# Patient Record
Sex: MALE | Race: WHITE | NOT HISPANIC OR LATINO | Employment: FULL TIME | URBAN - METROPOLITAN AREA
[De-identification: names, ages, dates, MRNs, and addresses within clinical notes are randomized per-mention and may not be internally consistent; named-entity substitution may affect disease eponyms.]

---

## 2023-03-03 ENCOUNTER — OFFICE VISIT (OUTPATIENT)
Dept: URGENT CARE | Facility: URGENT CARE | Age: 23
End: 2023-03-03
Payer: COMMERCIAL

## 2023-03-03 VITALS
HEART RATE: 91 BPM | DIASTOLIC BLOOD PRESSURE: 80 MMHG | OXYGEN SATURATION: 98 % | RESPIRATION RATE: 16 BRPM | SYSTOLIC BLOOD PRESSURE: 122 MMHG

## 2023-03-03 DIAGNOSIS — R21 RASH: Primary | ICD-10-CM

## 2023-03-03 PROCEDURE — 99203 OFFICE O/P NEW LOW 30 MIN: CPT | Performed by: FAMILY MEDICINE

## 2023-03-03 RX ORDER — KETOCONAZOLE 20 MG/G
CREAM TOPICAL 2 TIMES DAILY
Qty: 30 G | Refills: 0 | Status: SHIPPED | OUTPATIENT
Start: 2023-03-03 | End: 2023-03-17

## 2023-03-03 NOTE — PROGRESS NOTES
SUBJECTIVE: Luis Olivarez is a 23 year old male presenting with a chief complaint of rash legs.  Onset of symptoms was day(s) ago.  No past medical history on file.  Allergies   Allergen Reactions     Desonide Itching     Social History     Tobacco Use     Smoking status: Never     Smokeless tobacco: Never   Substance Use Topics     Alcohol use: Not on file       ROS:  SKIN: no rash  GI: no vomiting    OBJECTIVE:  /80   Pulse 91   Resp 16   SpO2 98% GENERAL APPEARANCE: healthy, alert and no distress  SKIN: bilateral legs anular ring rash      ICD-10-CM    1. Rash  R21 ketoconazole (NIZORAL) 2 % external cream     Adult Dermatology Referral          Fluids/Rest, f/u if worse/not any better

## 2023-06-02 ENCOUNTER — HEALTH MAINTENANCE LETTER (OUTPATIENT)
Age: 23
End: 2023-06-02

## 2024-06-29 ENCOUNTER — HEALTH MAINTENANCE LETTER (OUTPATIENT)
Age: 24
End: 2024-06-29

## 2025-07-12 ENCOUNTER — HEALTH MAINTENANCE LETTER (OUTPATIENT)
Age: 25
End: 2025-07-12

## 2025-07-27 ENCOUNTER — HOSPITAL ENCOUNTER (EMERGENCY)
Facility: CLINIC | Age: 25
Discharge: HOME | End: 2025-07-28
Attending: EMERGENCY MEDICINE | Admitting: EMERGENCY MEDICINE
Payer: OTHER MISCELLANEOUS

## 2025-07-27 DIAGNOSIS — M54.41 ACUTE RIGHT-SIDED LOW BACK PAIN WITH RIGHT-SIDED SCIATICA: Primary | ICD-10-CM

## 2025-07-27 PROCEDURE — 99284 EMERGENCY DEPT VISIT MOD MDM: CPT | Performed by: EMERGENCY MEDICINE

## 2025-07-27 PROCEDURE — 250N000013 HC RX MED GY IP 250 OP 250 PS 637: Performed by: EMERGENCY MEDICINE

## 2025-07-27 PROCEDURE — 96372 THER/PROPH/DIAG INJ SC/IM: CPT | Performed by: EMERGENCY MEDICINE

## 2025-07-27 PROCEDURE — 250N000011 HC RX IP 250 OP 636: Performed by: EMERGENCY MEDICINE

## 2025-07-27 RX ORDER — CYCLOBENZAPRINE HCL 10 MG
10 TABLET ORAL ONCE
Status: COMPLETED | OUTPATIENT
Start: 2025-07-27 | End: 2025-07-27

## 2025-07-27 RX ADMIN — CYCLOBENZAPRINE 10 MG: 10 TABLET, FILM COATED ORAL at 23:27

## 2025-07-27 RX ADMIN — HYDROMORPHONE HYDROCHLORIDE 1 MG: 1 INJECTION, SOLUTION INTRAMUSCULAR; INTRAVENOUS; SUBCUTANEOUS at 23:27

## 2025-07-27 ASSESSMENT — ACTIVITIES OF DAILY LIVING (ADL): ADLS_ACUITY_SCORE: 41

## 2025-07-27 ASSESSMENT — COLUMBIA-SUICIDE SEVERITY RATING SCALE - C-SSRS
1. IN THE PAST MONTH, HAVE YOU WISHED YOU WERE DEAD OR WISHED YOU COULD GO TO SLEEP AND NOT WAKE UP?: NO
6. HAVE YOU EVER DONE ANYTHING, STARTED TO DO ANYTHING, OR PREPARED TO DO ANYTHING TO END YOUR LIFE?: NO
2. HAVE YOU ACTUALLY HAD ANY THOUGHTS OF KILLING YOURSELF IN THE PAST MONTH?: NO

## 2025-07-27 NOTE — Clinical Note
Luis Olivarez was seen and treated in our emergency department on 7/27/2025.  He may return to work on 07/30/2025.  Mr. Olivarez was seen in our emergency department this evening.  He may need the next several days off of work to recover.  He may return without restrictions when he feels well enough to perform his work duties.     If you have any questions or concerns, please don't hesitate to call.      Trierweiler, Chad A, MD

## 2025-07-28 VITALS
HEART RATE: 71 BPM | WEIGHT: 200 LBS | BODY MASS INDEX: 25.67 KG/M2 | SYSTOLIC BLOOD PRESSURE: 126 MMHG | HEIGHT: 74 IN | TEMPERATURE: 97 F | OXYGEN SATURATION: 97 % | DIASTOLIC BLOOD PRESSURE: 77 MMHG | RESPIRATION RATE: 16 BRPM

## 2025-07-28 PROCEDURE — 250N000013 HC RX MED GY IP 250 OP 250 PS 637: Performed by: EMERGENCY MEDICINE

## 2025-07-28 RX ORDER — OXYCODONE HYDROCHLORIDE 5 MG/1
5 TABLET ORAL ONCE
Refills: 0 | Status: COMPLETED | OUTPATIENT
Start: 2025-07-28 | End: 2025-07-28

## 2025-07-28 RX ORDER — MORPHINE SULFATE 15 MG/1
15 TABLET ORAL EVERY 4 HOURS PRN
Qty: 10 TABLET | Refills: 0 | Status: SHIPPED | OUTPATIENT
Start: 2025-07-28

## 2025-07-28 RX ORDER — CYCLOBENZAPRINE HCL 10 MG
10 TABLET ORAL 3 TIMES DAILY PRN
Qty: 20 TABLET | Refills: 0 | Status: SHIPPED | OUTPATIENT
Start: 2025-07-28

## 2025-07-28 RX ADMIN — OXYCODONE HYDROCHLORIDE 5 MG: 5 TABLET ORAL at 00:38

## 2025-07-28 ASSESSMENT — ACTIVITIES OF DAILY LIVING (ADL): ADLS_ACUITY_SCORE: 41

## 2025-07-28 NOTE — ED TRIAGE NOTES
Pt has been treated for right sided sciatica with gabapentin, tylenol,and miloxicam for approx a week. Pt reports pain has gotten worse today. Numbness in ankle.     Triage Assessment (Adult)       Row Name 07/27/25 8577          Triage Assessment    Airway WDL WDL        Respiratory WDL    Respiratory WDL WDL        Skin Circulation/Temperature WDL    Skin Circulation/Temperature WDL WDL        Cardiac WDL    Cardiac WDL WDL        Peripheral/Neurovascular WDL    Peripheral Neurovascular WDL WDL        Cognitive/Neuro/Behavioral WDL    Cognitive/Neuro/Behavioral WDL WDL

## 2025-07-28 NOTE — ED PROVIDER NOTES
"  Emergency Department Note      History of Present Illness     Chief Complaint   Back Pain (Pt has been treated for right sided sciatica for approx a week on gapapentin, tylenol, and miloxicam. Pt has been to PT. Reports pain got worse this AM.)      HPI   Luis Olivarez is a 25 year old male presenting to us with exacerbation of his right sided low back pain with associated sciatica.  He injured his back approximately 2 weeks ago while moving a PNO at work.  He has since followed up with an outpatient orthopedist and undergone an MRI showing a significant disc bulge to the L5-S1 area of his lumbar spine.  He was feeling quite a bit improved yesterday, taking his prescribed Mobic, gabapentin, and Tylenol.  However, the pain was worse today and he has been experiencing intermittent muscle spasms with increasing sciatic symptoms.  He was unable to lay down tonight, prompting his visit to the ER.  He otherwise denies new trauma.  He denies issues with bowel or bladder.  He denies other complaints at this juncture.    Independent Historian   None    Review of External Notes   Yes-I reviewed the patient's visit to [facility] orthopedics on July 23 and July 24, reviewing his MRI results.    Past Medical History     Medical History and Problem List   No past medical history on file.    Medications   KEFLEX 250 MG/5ML OR SUSR  NO ACTIVE MEDICATIONS        Surgical History   No past surgical history on file.    Physical Exam     Patient Vitals for the past 24 hrs:   BP Temp Temp src Pulse Resp SpO2 Height Weight   07/27/25 2250 -- -- -- -- -- -- 1.88 m (6' 2\") 90.7 kg (200 lb)   07/27/25 2247 132/77 97  F (36.1  C) Temporal 90 24 96 % -- --     Physical Exam  Eye:  Pupils are equal, round, and reactive.  Extraocular movements intact.    ENT:  No rhinorrhea.  Moist mucus membranes.  Normal tongue and tonsil.    Cardiac:  Regular rate and rhythm.  No murmurs, gallops, or rubs.    Pulmonary:  Clear to auscultation bilaterally.  " No wheezes, rales, or rhonchi.    Abdomen:  Positive bowel sounds.  Abdomen is soft and non-distended, without focal tenderness.    Musculoskeletal:  No midline tenderness to the spine.  There is focal tenderness to the right lower lumbar musculature extending into the buttock and sciatic notch..    Skin:  Warm and dry without rashes.  Feet are equally warm with strong DP pulses.    Neuro:  Normal sensation throughout the legs and perineum.  5/5 strength through the hips/knees/ankles.  2+ patellar reflexes.  Normal gait.    Psych:  Normal affect with appropriate interaction with examiner.        Diagnostics         ED Course      Medications Administered   Medications - No data to display    Procedures   Procedures     Discussion of Management   None    ED Course        Additional Documentation  None    Medical Decision Making / Diagnosis     CMS Diagnoses: None    MIPS   None               [facility]   Luis Olivarez is a 25 year old male presenting to us with exacerbation of his known low back issues with MRI showing evidence of a disc bulge with associated sciatica.  There was no new trauma or other red flags on his physical exam that lead me to believe he would require repeat imaging.  The patient was given a dose of Dilaudid and Flexeril with significant improvement in symptoms.  Will plan to discharge him on instant release morphine and further Flexeril.  He is scheduled to undergo a epidural injection by his orthopedist this week which I advised him to be in touch with them about.  Otherwise, he will return to us for any worsening of condition or other emergent concerns.    Disposition   The patient was discharged.     Diagnosis   No diagnosis found.     Discharge Medications   New Prescriptions    No medications on file         Chad A. Trierweiler, MD Trierweiler, Chad A, MD  07/28/25 0124

## 2025-07-30 ENCOUNTER — HOSPITAL ENCOUNTER (EMERGENCY)
Facility: CLINIC | Age: 25
Discharge: HOME | End: 2025-07-30
Attending: EMERGENCY MEDICINE | Admitting: EMERGENCY MEDICINE

## 2025-07-30 VITALS
TEMPERATURE: 97.9 F | DIASTOLIC BLOOD PRESSURE: 83 MMHG | HEIGHT: 74 IN | OXYGEN SATURATION: 98 % | RESPIRATION RATE: 16 BRPM | WEIGHT: 200 LBS | BODY MASS INDEX: 25.67 KG/M2 | HEART RATE: 88 BPM | SYSTOLIC BLOOD PRESSURE: 125 MMHG

## 2025-07-30 DIAGNOSIS — M54.41 ACUTE RIGHT-SIDED LOW BACK PAIN WITH RIGHT-SIDED SCIATICA: Primary | ICD-10-CM

## 2025-07-30 PROCEDURE — 96372 THER/PROPH/DIAG INJ SC/IM: CPT | Performed by: EMERGENCY MEDICINE

## 2025-07-30 PROCEDURE — 250N000013 HC RX MED GY IP 250 OP 250 PS 637: Performed by: EMERGENCY MEDICINE

## 2025-07-30 PROCEDURE — 99284 EMERGENCY DEPT VISIT MOD MDM: CPT | Performed by: EMERGENCY MEDICINE

## 2025-07-30 PROCEDURE — 250N000011 HC RX IP 250 OP 636: Performed by: EMERGENCY MEDICINE

## 2025-07-30 RX ORDER — KETOROLAC TROMETHAMINE 15 MG/ML
15 INJECTION, SOLUTION INTRAMUSCULAR; INTRAVENOUS ONCE
Status: COMPLETED | OUTPATIENT
Start: 2025-07-30 | End: 2025-07-30

## 2025-07-30 RX ORDER — OXYCODONE HYDROCHLORIDE 5 MG/1
5 TABLET ORAL ONCE
Refills: 0 | Status: COMPLETED | OUTPATIENT
Start: 2025-07-30 | End: 2025-07-30

## 2025-07-30 RX ORDER — ACETAMINOPHEN 500 MG
1000 TABLET ORAL ONCE
Status: COMPLETED | OUTPATIENT
Start: 2025-07-30 | End: 2025-07-30

## 2025-07-30 RX ADMIN — OXYCODONE HYDROCHLORIDE 5 MG: 5 TABLET ORAL at 09:11

## 2025-07-30 RX ADMIN — KETOROLAC TROMETHAMINE 15 MG: 15 INJECTION, SOLUTION INTRAMUSCULAR; INTRAVENOUS at 09:11

## 2025-07-30 RX ADMIN — ACETAMINOPHEN 1000 MG: 500 TABLET, FILM COATED ORAL at 09:11

## 2025-07-30 ASSESSMENT — ACTIVITIES OF DAILY LIVING (ADL)
ADLS_ACUITY_SCORE: 41
ADLS_ACUITY_SCORE: 41

## 2025-07-30 ASSESSMENT — COLUMBIA-SUICIDE SEVERITY RATING SCALE - C-SSRS
1. IN THE PAST MONTH, HAVE YOU WISHED YOU WERE DEAD OR WISHED YOU COULD GO TO SLEEP AND NOT WAKE UP?: NO
2. HAVE YOU ACTUALLY HAD ANY THOUGHTS OF KILLING YOURSELF IN THE PAST MONTH?: NO
6. HAVE YOU EVER DONE ANYTHING, STARTED TO DO ANYTHING, OR PREPARED TO DO ANYTHING TO END YOUR LIFE?: NO

## 2025-07-30 NOTE — ED PROVIDER NOTES
"  Emergency Department Note      History of Present Illness     Chief Complaint   Leg Pain      HPI   Luis Olivarez is a 25 year old male who presents with worsening right lower back pain that radiates down his right leg for approximately 2 weeks. The patient reports he lifted a 500 lb upright piano 2 weeks ago while working as a  at [employer]. He reports his pain worsens with movement and has not been able to sleep well last night. He has an appointment with [facility] later today. He reports taking meloxicam last night and took two 325 mg tablets of Tylenol today at about 0200. He denies any new strenuous or heavy activity yesterday. Denies upper back pain, abdominal pain, loss of control of bowel or bladder, hematuria, dysuria or urinary frequency. He did not take any narcotics or ibuprofen yesterday. He is otherwise healthy.    Independent Historian   None    Review of External Notes   I reviewed ED note from 7/23/25 with Dr. Trierweiler.     Past Medical History     Medical History and Problem List   Dermatitis     Medications   Flexeril    Physical Exam     Patient Vitals for the past 24 hrs:   BP Temp Temp src Pulse Resp SpO2 Height Weight   07/30/25 0732 125/83 97.9  F (36.6  C) Temporal 88 16 98 % 1.88 m (6' 2\") 90.7 kg (200 lb)     Physical Exam  General:  Sitting on bed.   HENT:  No obvious trauma to head  Right Ear:  External ear normal.   Left Ear:  External ear normal.   Nose:  Nose normal.   Eyes:  Conjunctivae and EOM are normal. Pupils are equal, round, and reactive.   Neck: Normal range of motion. Neck supple. No tracheal deviation present.   CV:  Normal heart sounds. No murmur heard.  Pulm/Chest: Effort normal and breath sounds normal.   Abd: Soft. No distension. There is no tenderness. There is no rigidity, no rebound and no guarding. No CVA tenderness B/L. No pulsatile abdominal mass.  M/S: Normal range of motion. Muscle strength is +5 proximal and distal in the " bilateral lower extremities. Tenderness over the lower right paraspinal musculature. No midline tenderness or step off.  Neuro: Alert. GCS 15. Reflexes +2 B/L patella and achilles  Skin: Skin is warm and dry. No rash noted. Not diaphoretic. No rash of shingles on low back.  Psych: Normal mood and affect. Behavior is normal.       Diagnostics     Independent Interpretation   None    ED Course      Medications Administered   Medications   oxyCODONE (ROXICODONE) tablet 5 mg (5 mg Oral $Given 7/30/25 0911)   acetaminophen (TYLENOL) tablet 1,000 mg (1,000 mg Oral $Given 7/30/25 0911)   ketorolac (TORADOL) injection 15 mg (15 mg Intramuscular $Given 7/30/25 0911)     Procedures   Procedures     Discussion of Management   None    ED Course   ED Course as of 07/30/25 0921 Wed Jul 30, 2025 0900 I obtained history and examined the patient as noted above. I explained findings to the patient and we discussed plan for discharge. The patient is comfortable with this plan.       Additional Documentation  None    Medical Decision Making / Diagnosis     CMS Diagnoses: None    MIPS   None               [facility]   Luis Olivarez is a very pleasant 25 year old male who presents with concern of right low back pain he has had this for 2 weeks after an injury while on the job.  Patient has a follow-up today at [facility] orthopedics.  The patient's pain has been persistent and he could not get his narcotic pain medications filled.  The patient presents now because he has an appointment at 10 AM and does not feel like he can wait until his appointment.  Patient requesting pain control here.  The above interventions were provided.  Recommended against any narcotic prescriptions at this time and yielded to the follow-up spine specialist that he is planning to see in 1 hour.  The patient's partner is here to drive him to that appointment.  Patient does not have any CVA tenderness to suggest kidney stone or pyelonephritis.  No hematuria.  No  abdominal pain to suggest referred pain to the back from a intra-abdominal process such as appendicitis.  Reflexes are intact.  Strength is intact.  No loss of bowel or bladder.  Doubt cauda equina syndrome.  No prior back surgeries and doubt epidural abscess.  He is afebrile.  No rash or shingles.  Suspect this is persistent musculoskeletal back pain.    The treatment plan was discussed with the patient and they expressed understanding of this plan and consented to the plan.  In addition, the patient will return to the emergency department if their symptoms persist, worsen, if new symptoms arise or if there is any concern as other pathology may be present that is not evident at this time. They also understand the importance of close follow up in the clinic and if unable to do so will return to the emergency department for a reevaluation. All questions were answered.    Disposition   The patient was discharged.     Diagnosis     ICD-10-CM    1. Acute right-sided low back pain with right-sided sciatica  M54.41              Scribe Disclosure:  Xuan AQUINO, am serving as a scribe at 9:15 AM on 7/30/2025 to document services personally performed by Dex Camilo DO based on my observations and the provider's statements to me.        Dex Camilo DO  07/30/25 [id number]

## 2025-07-30 NOTE — ED TRIAGE NOTES
Patient injured back at work and having bad sciatica down R leg. Patient having increased pain in R leg. Has ortho appt at 10 today but pain too much.      Triage Assessment (Adult)       Row Name 07/30/25 0707          Triage Assessment    Airway WDL WDL        Respiratory WDL    Respiratory WDL WDL        Skin Circulation/Temperature WDL    Skin Circulation/Temperature WDL WDL        Cardiac WDL    Cardiac WDL WDL        Peripheral/Neurovascular WDL    Peripheral Neurovascular WDL WDL        Cognitive/Neuro/Behavioral WDL    Cognitive/Neuro/Behavioral WDL WDL